# Patient Record
Sex: FEMALE | Race: OTHER | HISPANIC OR LATINO
[De-identification: names, ages, dates, MRNs, and addresses within clinical notes are randomized per-mention and may not be internally consistent; named-entity substitution may affect disease eponyms.]

---

## 2018-01-23 PROBLEM — Z00.00 ENCOUNTER FOR PREVENTIVE HEALTH EXAMINATION: Status: ACTIVE | Noted: 2018-01-23

## 2018-02-06 ENCOUNTER — ASOB RESULT (OUTPATIENT)
Age: 28
End: 2018-02-06

## 2018-02-06 ENCOUNTER — APPOINTMENT (OUTPATIENT)
Dept: ANTEPARTUM | Facility: CLINIC | Age: 28
End: 2018-02-06
Payer: COMMERCIAL

## 2018-02-06 PROCEDURE — 76813 OB US NUCHAL MEAS 1 GEST: CPT

## 2018-02-06 PROCEDURE — 36416 COLLJ CAPILLARY BLOOD SPEC: CPT

## 2018-02-06 PROCEDURE — 76801 OB US < 14 WKS SINGLE FETUS: CPT

## 2018-02-20 ENCOUNTER — ASOB RESULT (OUTPATIENT)
Age: 28
End: 2018-02-20

## 2018-02-20 ENCOUNTER — APPOINTMENT (OUTPATIENT)
Dept: ANTEPARTUM | Facility: CLINIC | Age: 28
End: 2018-02-20
Payer: COMMERCIAL

## 2018-02-20 PROCEDURE — 76817 TRANSVAGINAL US OBSTETRIC: CPT

## 2018-02-20 PROCEDURE — 76815 OB US LIMITED FETUS(S): CPT

## 2018-03-13 ENCOUNTER — APPOINTMENT (OUTPATIENT)
Dept: ANTEPARTUM | Facility: CLINIC | Age: 28
End: 2018-03-13

## 2018-04-03 ENCOUNTER — APPOINTMENT (OUTPATIENT)
Dept: ANTEPARTUM | Facility: CLINIC | Age: 28
End: 2018-04-03
Payer: MEDICAID

## 2018-04-03 ENCOUNTER — ASOB RESULT (OUTPATIENT)
Age: 28
End: 2018-04-03

## 2018-04-03 PROCEDURE — 76811 OB US DETAILED SNGL FETUS: CPT | Mod: 26

## 2018-04-03 PROCEDURE — 76817 TRANSVAGINAL US OBSTETRIC: CPT | Mod: 26

## 2018-08-22 ENCOUNTER — OUTPATIENT (OUTPATIENT)
Dept: OUTPATIENT SERVICES | Facility: HOSPITAL | Age: 28
LOS: 1 days | End: 2018-08-22

## 2018-08-22 ENCOUNTER — EMERGENCY (EMERGENCY)
Facility: HOSPITAL | Age: 28
LOS: 1 days | Discharge: STILL ADMITTED OR ADMINISTRATIVE | End: 2018-08-22
Admitting: EMERGENCY MEDICINE

## 2018-08-22 ENCOUNTER — INPATIENT (INPATIENT)
Facility: HOSPITAL | Age: 28
LOS: 2 days | Discharge: HOME | End: 2018-08-25
Attending: OBSTETRICS & GYNECOLOGY | Admitting: OBSTETRICS & GYNECOLOGY

## 2018-08-22 VITALS
OXYGEN SATURATION: 100 % | TEMPERATURE: 99 F | HEART RATE: 90 BPM | SYSTOLIC BLOOD PRESSURE: 118 MMHG | RESPIRATION RATE: 16 BRPM | DIASTOLIC BLOOD PRESSURE: 75 MMHG

## 2018-08-22 VITALS
DIASTOLIC BLOOD PRESSURE: 69 MMHG | TEMPERATURE: 99 F | OXYGEN SATURATION: 100 % | HEART RATE: 103 BPM | RESPIRATION RATE: 16 BRPM | SYSTOLIC BLOOD PRESSURE: 133 MMHG

## 2018-08-22 DIAGNOSIS — Z3A.00 WEEKS OF GESTATION OF PREGNANCY NOT SPECIFIED: ICD-10-CM

## 2018-08-22 DIAGNOSIS — O26.899 OTHER SPECIFIED PREGNANCY RELATED CONDITIONS, UNSPECIFIED TRIMESTER: ICD-10-CM

## 2018-08-22 DIAGNOSIS — O26.90 PREGNANCY RELATED CONDITIONS, UNSPECIFIED, UNSPECIFIED TRIMESTER: ICD-10-CM

## 2018-08-22 NOTE — ED ADULT TRIAGE NOTE - RESPIRATORY RATE (BREATHS/MIN)
Patient is still experiencing bloating and has pain on her right side.  Appointment scheduled to discuss ultrasound results with Dr. Richter.    16

## 2018-08-22 NOTE — ED ADULT TRIAGE NOTE - CHIEF COMPLAINT QUOTE
pt arrives w/ c/o contractions 5 mins apart. pt states MARCIN 8/21/18 with +fetal movements. pt denies any vaginal discharge or water breaking. Spoke with L&D Daylin butcher to come to L&D.

## 2018-08-23 ENCOUNTER — TRANSCRIPTION ENCOUNTER (OUTPATIENT)
Age: 28
End: 2018-08-23

## 2018-08-23 VITALS — WEIGHT: 205.03 LBS | HEIGHT: 63 IN

## 2018-08-23 LAB
APPEARANCE UR: SIGNIFICANT CHANGE UP
BACTERIA # UR AUTO: NEGATIVE — SIGNIFICANT CHANGE UP
BASOPHILS # BLD AUTO: 0.01 K/UL — SIGNIFICANT CHANGE UP (ref 0–0.2)
BASOPHILS # BLD AUTO: 0.02 K/UL — SIGNIFICANT CHANGE UP (ref 0–0.2)
BASOPHILS NFR BLD AUTO: 0.1 % — SIGNIFICANT CHANGE UP (ref 0–2)
BASOPHILS NFR BLD AUTO: 0.1 % — SIGNIFICANT CHANGE UP (ref 0–2)
BILIRUB UR-MCNC: NEGATIVE — SIGNIFICANT CHANGE UP
BLD GP AB SCN SERPL QL: NEGATIVE — SIGNIFICANT CHANGE UP
BLOOD UR QL VISUAL: HIGH
COLOR SPEC: YELLOW — SIGNIFICANT CHANGE UP
EOSINOPHIL # BLD AUTO: 0 K/UL — SIGNIFICANT CHANGE UP (ref 0–0.5)
EOSINOPHIL # BLD AUTO: 0 K/UL — SIGNIFICANT CHANGE UP (ref 0–0.5)
EOSINOPHIL NFR BLD AUTO: 0 % — SIGNIFICANT CHANGE UP (ref 0–6)
EOSINOPHIL NFR BLD AUTO: 0 % — SIGNIFICANT CHANGE UP (ref 0–6)
GLUCOSE UR-MCNC: NEGATIVE — SIGNIFICANT CHANGE UP
GRAN CASTS # UR COMP ASSIST: SIGNIFICANT CHANGE UP
HCT VFR BLD CALC: 35.3 % — SIGNIFICANT CHANGE UP (ref 34.5–45)
HCT VFR BLD CALC: 40 % — SIGNIFICANT CHANGE UP (ref 34.5–45)
HGB BLD-MCNC: 11.5 G/DL — SIGNIFICANT CHANGE UP (ref 11.5–15.5)
HGB BLD-MCNC: 13 G/DL — SIGNIFICANT CHANGE UP (ref 11.5–15.5)
IMM GRANULOCYTES # BLD AUTO: 0.06 # — SIGNIFICANT CHANGE UP
IMM GRANULOCYTES # BLD AUTO: 0.16 # — SIGNIFICANT CHANGE UP
IMM GRANULOCYTES NFR BLD AUTO: 0.5 % — SIGNIFICANT CHANGE UP (ref 0–1.5)
IMM GRANULOCYTES NFR BLD AUTO: 0.8 % — SIGNIFICANT CHANGE UP (ref 0–1.5)
KETONES UR-MCNC: >150 — HIGH
LEUKOCYTE ESTERASE UR-ACNC: NEGATIVE — SIGNIFICANT CHANGE UP
LYMPHOCYTES # BLD AUTO: 1.38 K/UL — SIGNIFICANT CHANGE UP (ref 1–3.3)
LYMPHOCYTES # BLD AUTO: 1.49 K/UL — SIGNIFICANT CHANGE UP (ref 1–3.3)
LYMPHOCYTES # BLD AUTO: 11 % — LOW (ref 13–44)
LYMPHOCYTES # BLD AUTO: 7.8 % — LOW (ref 13–44)
MCHC RBC-ENTMCNC: 28.2 PG — SIGNIFICANT CHANGE UP (ref 27–34)
MCHC RBC-ENTMCNC: 28.4 PG — SIGNIFICANT CHANGE UP (ref 27–34)
MCHC RBC-ENTMCNC: 32.5 % — SIGNIFICANT CHANGE UP (ref 32–36)
MCHC RBC-ENTMCNC: 32.6 % — SIGNIFICANT CHANGE UP (ref 32–36)
MCV RBC AUTO: 86.5 FL — SIGNIFICANT CHANGE UP (ref 80–100)
MCV RBC AUTO: 87.5 FL — SIGNIFICANT CHANGE UP (ref 80–100)
MONOCYTES # BLD AUTO: 0.56 K/UL — SIGNIFICANT CHANGE UP (ref 0–0.9)
MONOCYTES # BLD AUTO: 1.47 K/UL — HIGH (ref 0–0.9)
MONOCYTES NFR BLD AUTO: 4.5 % — SIGNIFICANT CHANGE UP (ref 2–14)
MONOCYTES NFR BLD AUTO: 7.7 % — SIGNIFICANT CHANGE UP (ref 2–14)
NEUTROPHILS # BLD AUTO: 10.51 K/UL — HIGH (ref 1.8–7.4)
NEUTROPHILS # BLD AUTO: 16.05 K/UL — HIGH (ref 1.8–7.4)
NEUTROPHILS NFR BLD AUTO: 83.6 % — HIGH (ref 43–77)
NEUTROPHILS NFR BLD AUTO: 83.9 % — HIGH (ref 43–77)
NITRITE UR-MCNC: NEGATIVE — SIGNIFICANT CHANGE UP
NRBC # FLD: 0 — SIGNIFICANT CHANGE UP
NRBC # FLD: 0 — SIGNIFICANT CHANGE UP
PH UR: 6 — SIGNIFICANT CHANGE UP (ref 5–8)
PLATELET # BLD AUTO: 199 K/UL — SIGNIFICANT CHANGE UP (ref 150–400)
PLATELET # BLD AUTO: 215 K/UL — SIGNIFICANT CHANGE UP (ref 150–400)
PMV BLD: 10.3 FL — SIGNIFICANT CHANGE UP (ref 7–13)
PMV BLD: 10.4 FL — SIGNIFICANT CHANGE UP (ref 7–13)
PROT UR-MCNC: HIGH
RBC # BLD: 4.08 M/UL — SIGNIFICANT CHANGE UP (ref 3.8–5.2)
RBC # BLD: 4.57 M/UL — SIGNIFICANT CHANGE UP (ref 3.8–5.2)
RBC # FLD: 13.2 % — SIGNIFICANT CHANGE UP (ref 10.3–14.5)
RBC # FLD: 13.2 % — SIGNIFICANT CHANGE UP (ref 10.3–14.5)
RBC CASTS # UR COMP ASSIST: >50 — HIGH (ref 0–?)
RH IG SCN BLD-IMP: POSITIVE — SIGNIFICANT CHANGE UP
RH IG SCN BLD-IMP: POSITIVE — SIGNIFICANT CHANGE UP
SP GR SPEC: 1.03 — SIGNIFICANT CHANGE UP (ref 1–1.04)
SQUAMOUS # UR AUTO: SIGNIFICANT CHANGE UP
T PALLIDUM AB TITR SER: NEGATIVE — SIGNIFICANT CHANGE UP
UROBILINOGEN FLD QL: SIGNIFICANT CHANGE UP
WBC # BLD: 12.52 K/UL — HIGH (ref 3.8–10.5)
WBC # BLD: 19.19 K/UL — HIGH (ref 3.8–10.5)
WBC # FLD AUTO: 12.52 K/UL — HIGH (ref 3.8–10.5)
WBC # FLD AUTO: 19.19 K/UL — HIGH (ref 3.8–10.5)
WBC UR QL: HIGH (ref 0–?)

## 2018-08-23 RX ORDER — FERROUS SULFATE 325(65) MG
325 TABLET ORAL DAILY
Qty: 0 | Refills: 0 | Status: DISCONTINUED | OUTPATIENT
Start: 2018-08-23 | End: 2018-08-24

## 2018-08-23 RX ORDER — GLYCERIN ADULT
1 SUPPOSITORY, RECTAL RECTAL AT BEDTIME
Qty: 0 | Refills: 0 | Status: DISCONTINUED | OUTPATIENT
Start: 2018-08-23 | End: 2018-08-24

## 2018-08-23 RX ORDER — SODIUM CHLORIDE 9 MG/ML
1000 INJECTION, SOLUTION INTRAVENOUS
Qty: 0 | Refills: 0 | Status: DISCONTINUED | OUTPATIENT
Start: 2018-08-23 | End: 2018-08-24

## 2018-08-23 RX ORDER — HYDROCORTISONE 1 %
1 OINTMENT (GRAM) TOPICAL EVERY 4 HOURS
Qty: 0 | Refills: 0 | Status: DISCONTINUED | OUTPATIENT
Start: 2018-08-23 | End: 2018-08-23

## 2018-08-23 RX ORDER — GENTAMICIN SULFATE 40 MG/ML
465 VIAL (ML) INJECTION ONCE
Qty: 0 | Refills: 0 | Status: COMPLETED | OUTPATIENT
Start: 2018-08-23 | End: 2018-08-23

## 2018-08-23 RX ORDER — OXYCODONE HYDROCHLORIDE 5 MG/1
5 TABLET ORAL
Qty: 0 | Refills: 0 | Status: DISCONTINUED | OUTPATIENT
Start: 2018-08-23 | End: 2018-08-25

## 2018-08-23 RX ORDER — SODIUM CHLORIDE 9 MG/ML
3 INJECTION INTRAMUSCULAR; INTRAVENOUS; SUBCUTANEOUS EVERY 8 HOURS
Qty: 0 | Refills: 0 | Status: DISCONTINUED | OUTPATIENT
Start: 2018-08-23 | End: 2018-08-23

## 2018-08-23 RX ORDER — DIBUCAINE 1 %
1 OINTMENT (GRAM) RECTAL EVERY 4 HOURS
Qty: 0 | Refills: 0 | Status: DISCONTINUED | OUTPATIENT
Start: 2018-08-23 | End: 2018-08-23

## 2018-08-23 RX ORDER — AMPICILLIN TRIHYDRATE 250 MG
CAPSULE ORAL
Qty: 0 | Refills: 0 | Status: DISCONTINUED | OUTPATIENT
Start: 2018-08-23 | End: 2018-08-24

## 2018-08-23 RX ORDER — KETOROLAC TROMETHAMINE 30 MG/ML
30 SYRINGE (ML) INJECTION ONCE
Qty: 0 | Refills: 0 | Status: DISCONTINUED | OUTPATIENT
Start: 2018-08-23 | End: 2018-08-23

## 2018-08-23 RX ORDER — AMPICILLIN TRIHYDRATE 250 MG
2 CAPSULE ORAL EVERY 6 HOURS
Qty: 0 | Refills: 0 | Status: DISCONTINUED | OUTPATIENT
Start: 2018-08-23 | End: 2018-08-24

## 2018-08-23 RX ORDER — ACETAMINOPHEN 500 MG
975 TABLET ORAL ONCE
Qty: 0 | Refills: 0 | Status: COMPLETED | OUTPATIENT
Start: 2018-08-23 | End: 2018-08-23

## 2018-08-23 RX ORDER — DOCUSATE SODIUM 100 MG
100 CAPSULE ORAL
Qty: 0 | Refills: 0 | Status: DISCONTINUED | OUTPATIENT
Start: 2018-08-23 | End: 2018-08-24

## 2018-08-23 RX ORDER — OXYTOCIN 10 UNIT/ML
333.33 VIAL (ML) INJECTION
Qty: 20 | Refills: 0 | Status: COMPLETED | OUTPATIENT
Start: 2018-08-23 | End: 2018-08-23

## 2018-08-23 RX ORDER — SODIUM CHLORIDE 9 MG/ML
1000 INJECTION, SOLUTION INTRAVENOUS ONCE
Qty: 0 | Refills: 0 | Status: DISCONTINUED | OUTPATIENT
Start: 2018-08-23 | End: 2018-08-23

## 2018-08-23 RX ORDER — AMPICILLIN TRIHYDRATE 250 MG
2 CAPSULE ORAL ONCE
Qty: 0 | Refills: 0 | Status: COMPLETED | OUTPATIENT
Start: 2018-08-23 | End: 2018-08-23

## 2018-08-23 RX ORDER — ACETAMINOPHEN 500 MG
975 TABLET ORAL EVERY 6 HOURS
Qty: 0 | Refills: 0 | Status: COMPLETED | OUTPATIENT
Start: 2018-08-23 | End: 2019-07-22

## 2018-08-23 RX ORDER — SIMETHICONE 80 MG/1
80 TABLET, CHEWABLE ORAL EVERY 4 HOURS
Qty: 0 | Refills: 0 | Status: DISCONTINUED | OUTPATIENT
Start: 2018-08-23 | End: 2018-08-24

## 2018-08-23 RX ORDER — OXYTOCIN 10 UNIT/ML
41.67 VIAL (ML) INJECTION
Qty: 20 | Refills: 0 | Status: DISCONTINUED | OUTPATIENT
Start: 2018-08-23 | End: 2018-08-23

## 2018-08-23 RX ORDER — OXYCODONE HYDROCHLORIDE 5 MG/1
5 TABLET ORAL EVERY 4 HOURS
Qty: 0 | Refills: 0 | Status: DISCONTINUED | OUTPATIENT
Start: 2018-08-23 | End: 2018-08-25

## 2018-08-23 RX ORDER — OXYTOCIN 10 UNIT/ML
333.33 VIAL (ML) INJECTION
Qty: 20 | Refills: 0 | Status: COMPLETED | OUTPATIENT
Start: 2018-08-23

## 2018-08-23 RX ORDER — GENTAMICIN SULFATE 40 MG/ML
VIAL (ML) INJECTION
Qty: 0 | Refills: 0 | Status: DISCONTINUED | OUTPATIENT
Start: 2018-08-23 | End: 2018-08-23

## 2018-08-23 RX ORDER — SODIUM CHLORIDE 9 MG/ML
1000 INJECTION, SOLUTION INTRAVENOUS
Qty: 0 | Refills: 0 | Status: DISCONTINUED | OUTPATIENT
Start: 2018-08-23 | End: 2018-08-23

## 2018-08-23 RX ORDER — ACETAMINOPHEN 500 MG
975 TABLET ORAL EVERY 6 HOURS
Qty: 0 | Refills: 0 | Status: DISCONTINUED | OUTPATIENT
Start: 2018-08-23 | End: 2018-08-25

## 2018-08-23 RX ORDER — OXYTOCIN 10 UNIT/ML
41.67 VIAL (ML) INJECTION
Qty: 20 | Refills: 0 | Status: DISCONTINUED | OUTPATIENT
Start: 2018-08-23 | End: 2018-08-24

## 2018-08-23 RX ORDER — AER TRAVELER 0.5 G/1
1 SOLUTION RECTAL; TOPICAL EVERY 4 HOURS
Qty: 0 | Refills: 0 | Status: DISCONTINUED | OUTPATIENT
Start: 2018-08-23 | End: 2018-08-23

## 2018-08-23 RX ORDER — CITRIC ACID/SODIUM CITRATE 300-500 MG
15 SOLUTION, ORAL ORAL EVERY 4 HOURS
Qty: 0 | Refills: 0 | Status: DISCONTINUED | OUTPATIENT
Start: 2018-08-23 | End: 2018-08-23

## 2018-08-23 RX ORDER — DIPHENHYDRAMINE HCL 50 MG
25 CAPSULE ORAL EVERY 6 HOURS
Qty: 0 | Refills: 0 | Status: DISCONTINUED | OUTPATIENT
Start: 2018-08-23 | End: 2018-08-24

## 2018-08-23 RX ORDER — IBUPROFEN 200 MG
600 TABLET ORAL EVERY 6 HOURS
Qty: 0 | Refills: 0 | Status: COMPLETED | OUTPATIENT
Start: 2018-08-23 | End: 2019-07-22

## 2018-08-23 RX ORDER — IBUPROFEN 200 MG
600 TABLET ORAL EVERY 6 HOURS
Qty: 0 | Refills: 0 | Status: DISCONTINUED | OUTPATIENT
Start: 2018-08-23 | End: 2018-08-25

## 2018-08-23 RX ORDER — PRAMOXINE HYDROCHLORIDE 150 MG/15G
1 AEROSOL, FOAM RECTAL EVERY 4 HOURS
Qty: 0 | Refills: 0 | Status: DISCONTINUED | OUTPATIENT
Start: 2018-08-23 | End: 2018-08-23

## 2018-08-23 RX ORDER — LANOLIN
1 OINTMENT (GRAM) TOPICAL
Qty: 0 | Refills: 0 | Status: DISCONTINUED | OUTPATIENT
Start: 2018-08-23 | End: 2018-08-24

## 2018-08-23 RX ORDER — TETANUS TOXOID, REDUCED DIPHTHERIA TOXOID AND ACELLULAR PERTUSSIS VACCINE, ADSORBED 5; 2.5; 8; 8; 2.5 [IU]/.5ML; [IU]/.5ML; UG/.5ML; UG/.5ML; UG/.5ML
0.5 SUSPENSION INTRAMUSCULAR ONCE
Qty: 0 | Refills: 0 | Status: DISCONTINUED | OUTPATIENT
Start: 2018-08-23 | End: 2018-08-24

## 2018-08-23 RX ADMIN — Medication 250 MILLIGRAM(S): at 10:50

## 2018-08-23 RX ADMIN — Medication 975 MILLIGRAM(S): at 10:18

## 2018-08-23 RX ADMIN — Medication 100 MILLIGRAM(S): at 14:00

## 2018-08-23 RX ADMIN — Medication 975 MILLIGRAM(S): at 18:15

## 2018-08-23 RX ADMIN — Medication 216 GRAM(S): at 17:13

## 2018-08-23 RX ADMIN — Medication 975 MILLIGRAM(S): at 23:40

## 2018-08-23 RX ADMIN — Medication 30 MILLIGRAM(S): at 14:36

## 2018-08-23 RX ADMIN — Medication 975 MILLIGRAM(S): at 17:12

## 2018-08-23 RX ADMIN — Medication 0.2 MILLIGRAM(S): at 22:35

## 2018-08-23 RX ADMIN — Medication 100 MILLIGRAM(S): at 22:35

## 2018-08-23 RX ADMIN — Medication 30 MILLIGRAM(S): at 14:00

## 2018-08-23 RX ADMIN — Medication 0.2 MILLIGRAM(S): at 11:47

## 2018-08-23 RX ADMIN — Medication 1000 MILLIUNIT(S)/MIN: at 12:00

## 2018-08-23 RX ADMIN — Medication 0.2 MILLIGRAM(S): at 17:13

## 2018-08-23 RX ADMIN — Medication 125 MILLIUNIT(S)/MIN: at 14:06

## 2018-08-23 RX ADMIN — Medication 600 MILLIGRAM(S): at 23:40

## 2018-08-23 RX ADMIN — Medication 216 GRAM(S): at 10:20

## 2018-08-23 NOTE — DISCHARGE NOTE OB - CARE PROVIDER_API CALL
Tiffanie Dumas), [address]  [address] [place] [address]  Phone: [phone]  Fax: [phone]

## 2018-08-23 NOTE — DISCHARGE NOTE OB - MEDICATION SUMMARY - MEDICATIONS TO TAKE
"  Adult Mental Health Outpatient Group Therapy Progress Note       See Initial Treatment suggestions for the client during the time between Diagnostic Assessment and completion of the Master Individualized Treatment Plan.    Treatment Goals:     Will plan and problem-solve to return to previous socia, enjoyable activities and decrease lonliness and isolation  Will plan and problem-solve to decrease fears and anxiety and increase self confidence                       Status: Active  Client will notify staff when needing assistance to develop or implement a coping plan to manage suicidal or self injurious urges.       Area of Treatment Focus:  Symptom Management    Therapeutic Interventions/Treatment Strategies:  Support, Feedback, Structured Activity, Problem Solving, Clarification and Education    Response to Treatment Strategies:  Accepted Feedback, Gave Feedback, Listened, Focused on Goals, Attentive, Accepted Support and Alert    Name of Group:  Coping with Depression     Description and Outcome:  Client participated in a \"Monisha Journal\" exercise which included writing about the positives in one's life and identifying positive steps to take in the upcoming week. Katerina identified people/things in her life that she is thankful for, although she would quickly add \"I don't feel good though\". She made a plan to visit a park this week.  Client demonstrated an understanding of the group content by participating and sharing, although she indicated feeling down today, which made some of the topics challenging.     Is this a Weekly Review of the Progress on the Treatment Plan?  No      " I will START or STAY ON the medications listed below when I get home from the hospital:  None

## 2018-08-23 NOTE — DISCHARGE NOTE OB - PATIENT PORTAL LINK FT
You can access the [facility] Patient Portal, offered by [facility], by registering with the following website: [email or web address]

## 2018-08-23 NOTE — DISCHARGE NOTE OB - CARE PLAN
Principal Discharge DX:	Vacuum extractor delivery, delivered  Goal:	Routine recovery  Assessment and plan of treatment:	Routine postpartum care

## 2018-08-24 DIAGNOSIS — R50.9 FEVER, UNSPECIFIED: ICD-10-CM

## 2018-08-24 LAB
SPECIMEN SOURCE: SIGNIFICANT CHANGE UP

## 2018-08-24 RX ORDER — PRAMOXINE HYDROCHLORIDE 150 MG/15G
1 AEROSOL, FOAM RECTAL EVERY 4 HOURS
Qty: 0 | Refills: 0 | Status: DISCONTINUED | OUTPATIENT
Start: 2018-08-24 | End: 2018-08-25

## 2018-08-24 RX ORDER — DIBUCAINE 1 %
1 OINTMENT (GRAM) RECTAL EVERY 4 HOURS
Qty: 0 | Refills: 0 | Status: DISCONTINUED | OUTPATIENT
Start: 2018-08-24 | End: 2018-08-25

## 2018-08-24 RX ORDER — TETANUS TOXOID, REDUCED DIPHTHERIA TOXOID AND ACELLULAR PERTUSSIS VACCINE, ADSORBED 5; 2.5; 8; 8; 2.5 [IU]/.5ML; [IU]/.5ML; UG/.5ML; UG/.5ML; UG/.5ML
0.5 SUSPENSION INTRAMUSCULAR ONCE
Qty: 0 | Refills: 0 | Status: DISCONTINUED | OUTPATIENT
Start: 2018-08-24 | End: 2018-08-25

## 2018-08-24 RX ORDER — HYDROCORTISONE 1 %
1 OINTMENT (GRAM) TOPICAL EVERY 4 HOURS
Qty: 0 | Refills: 0 | Status: DISCONTINUED | OUTPATIENT
Start: 2018-08-24 | End: 2018-08-25

## 2018-08-24 RX ORDER — PRAMOXINE HYDROCHLORIDE 150 MG/15G
1 AEROSOL, FOAM RECTAL EVERY 4 HOURS
Qty: 0 | Refills: 0 | Status: DISCONTINUED | OUTPATIENT
Start: 2018-08-24 | End: 2018-08-24

## 2018-08-24 RX ORDER — OXYTOCIN 10 UNIT/ML
41.67 VIAL (ML) INJECTION
Qty: 20 | Refills: 0 | Status: DISCONTINUED | OUTPATIENT
Start: 2018-08-24 | End: 2018-08-24

## 2018-08-24 RX ORDER — AER TRAVELER 0.5 G/1
1 SOLUTION RECTAL; TOPICAL EVERY 4 HOURS
Qty: 0 | Refills: 0 | Status: DISCONTINUED | OUTPATIENT
Start: 2018-08-24 | End: 2018-08-25

## 2018-08-24 RX ORDER — HYDROCORTISONE 1 %
1 OINTMENT (GRAM) TOPICAL EVERY 4 HOURS
Qty: 0 | Refills: 0 | Status: DISCONTINUED | OUTPATIENT
Start: 2018-08-24 | End: 2018-08-24

## 2018-08-24 RX ORDER — GLYCERIN ADULT
1 SUPPOSITORY, RECTAL RECTAL AT BEDTIME
Qty: 0 | Refills: 0 | Status: DISCONTINUED | OUTPATIENT
Start: 2018-08-24 | End: 2018-08-25

## 2018-08-24 RX ORDER — DIPHENHYDRAMINE HCL 50 MG
25 CAPSULE ORAL EVERY 6 HOURS
Qty: 0 | Refills: 0 | Status: DISCONTINUED | OUTPATIENT
Start: 2018-08-24 | End: 2018-08-25

## 2018-08-24 RX ORDER — DOCUSATE SODIUM 100 MG
100 CAPSULE ORAL
Qty: 0 | Refills: 0 | Status: DISCONTINUED | OUTPATIENT
Start: 2018-08-24 | End: 2018-08-25

## 2018-08-24 RX ORDER — SIMETHICONE 80 MG/1
80 TABLET, CHEWABLE ORAL EVERY 6 HOURS
Qty: 0 | Refills: 0 | Status: DISCONTINUED | OUTPATIENT
Start: 2018-08-24 | End: 2018-08-25

## 2018-08-24 RX ORDER — SODIUM CHLORIDE 9 MG/ML
3 INJECTION INTRAMUSCULAR; INTRAVENOUS; SUBCUTANEOUS EVERY 8 HOURS
Qty: 0 | Refills: 0 | Status: DISCONTINUED | OUTPATIENT
Start: 2018-08-24 | End: 2018-08-25

## 2018-08-24 RX ORDER — MAGNESIUM HYDROXIDE 400 MG/1
30 TABLET, CHEWABLE ORAL
Qty: 0 | Refills: 0 | Status: DISCONTINUED | OUTPATIENT
Start: 2018-08-24 | End: 2018-08-25

## 2018-08-24 RX ORDER — LANOLIN
1 OINTMENT (GRAM) TOPICAL EVERY 6 HOURS
Qty: 0 | Refills: 0 | Status: DISCONTINUED | OUTPATIENT
Start: 2018-08-24 | End: 2018-08-25

## 2018-08-24 RX ADMIN — Medication 975 MILLIGRAM(S): at 00:10

## 2018-08-24 RX ADMIN — Medication 216 GRAM(S): at 01:00

## 2018-08-24 RX ADMIN — Medication 600 MILLIGRAM(S): at 21:00

## 2018-08-24 RX ADMIN — Medication 600 MILLIGRAM(S): at 00:10

## 2018-08-24 RX ADMIN — Medication 975 MILLIGRAM(S): at 07:52

## 2018-08-24 RX ADMIN — Medication 600 MILLIGRAM(S): at 07:53

## 2018-08-24 RX ADMIN — Medication 600 MILLIGRAM(S): at 07:20

## 2018-08-24 RX ADMIN — Medication 0.2 MILLIGRAM(S): at 04:04

## 2018-08-24 RX ADMIN — OXYCODONE HYDROCHLORIDE 5 MILLIGRAM(S): 5 TABLET ORAL at 19:44

## 2018-08-24 RX ADMIN — Medication 975 MILLIGRAM(S): at 15:54

## 2018-08-24 RX ADMIN — OXYCODONE HYDROCHLORIDE 5 MILLIGRAM(S): 5 TABLET ORAL at 20:15

## 2018-08-24 RX ADMIN — Medication 975 MILLIGRAM(S): at 21:30

## 2018-08-24 RX ADMIN — Medication 975 MILLIGRAM(S): at 16:13

## 2018-08-24 RX ADMIN — Medication 600 MILLIGRAM(S): at 21:30

## 2018-08-24 RX ADMIN — Medication 600 MILLIGRAM(S): at 15:55

## 2018-08-24 RX ADMIN — Medication 600 MILLIGRAM(S): at 16:13

## 2018-08-24 RX ADMIN — Medication 100 MILLIGRAM(S): at 07:30

## 2018-08-24 RX ADMIN — Medication 975 MILLIGRAM(S): at 21:00

## 2018-08-24 RX ADMIN — Medication 975 MILLIGRAM(S): at 07:20

## 2018-08-24 RX ADMIN — Medication 216 GRAM(S): at 08:03

## 2018-08-24 RX ADMIN — Medication 0.2 MILLIGRAM(S): at 08:40

## 2018-08-24 NOTE — PROGRESS NOTE ADULT - PROBLEM SELECTOR PLAN 2
- continue amp/gent/clinda until 24 hours after delivery  - f/u cultures    Ying Jolly, PGY1  Pager #[phone]

## 2018-08-24 NOTE — PROGRESS NOTE ADULT - SUBJECTIVE AND OBJECTIVE BOX
OB Progress Note: VAVD PPD#1    S: 27yo PPD#1 s/p VAVD, c/b IPT. Patient feels well. Pain is well controlled. She is tolerating a regular diet and passing flatus. She is voiding spontaneously, and ambulating without difficulty. Denies CP/SOB. Denies HA/lightheadedness/dizziness. Denies N/V. Denies calf pain.    O:  Vitals:  Vital Signs Last 24 Hrs  T(C): 36.7 (24 Aug 2018 06:00), Max: 37.6 (23 Aug 2018 12:15)  T(F): 98.1 (24 Aug 2018 06:00), Max: 99.7 (23 Aug 2018 12:15)  HR: 73 (24 Aug 2018 06:00) (73 - 120)  BP: 107/65 (24 Aug 2018 06:00) (101/53 - 114/54)  BP(mean): --  RR: 16 (24 Aug 2018 06:00) (16 - 18)  SpO2: 100% (24 Aug 2018 06:00) (100% - 100%)    MEDICATIONS  (STANDING):  acetaminophen   Tablet. 975 milliGRAM(s) Oral every 6 hours  ampicillin  IVPB      ampicillin  IVPB 2 Gram(s) IV Intermittent every 6 hours  clindamycin IVPB 900 milliGRAM(s) IV Intermittent every 8 hours  clindamycin IVPB      diphtheria/tetanus/pertussis (acellular) Vaccine (ADAcel) 0.5 milliLiter(s) IntraMuscular once  ibuprofen  Tablet 600 milliGRAM(s) Oral every 6 hours  methylergonovine 0.2 milliGRAM(s) Oral every 4 hours  oxyCODONE    IR 5 milliGRAM(s) Oral every 3 hours  prenatal multivitamin 1 Tablet(s) Oral daily  sodium chloride 0.9% lock flush 3 milliLiter(s) IV Push every 8 hours      Labs:  Blood type: O Positive  Rubella IgG: RPR: Negative                          11.5   19.19<H> >-----------< 199    ( 08-23 @ 14:30 )             35.3                        13.0   12.52<H> >-----------< 215    ( 08-23 @ 00:10 )             40.0        Physical Exam:  General: NAD  Abdomen: soft, non-tender, non-distended, fundus firm  Vaginal: lochia wnl, exam deferred  Extremities: no erythema/calf tenderness

## 2018-08-24 NOTE — PROGRESS NOTE ADULT - SUBJECTIVE AND OBJECTIVE BOX
ANESTHESIA POSTOP CHECK    28y Female POSTOP DAY 1 S/P labor epidural    Vital Signs Last 24 Hrs  T(C): 36.8 (24 Aug 2018 14:00), Max: 37.2 (23 Aug 2018 16:07)  T(F): 98.2 (24 Aug 2018 14:00), Max: 99 (23 Aug 2018 16:07)  HR: 91 (24 Aug 2018 14:00) (73 - 102)  BP: 96/60 (24 Aug 2018 14:00) (96/60 - 111/59)  BP(mean): --  RR: 18 (24 Aug 2018 14:00) (16 - 18)  SpO2: 100% (24 Aug 2018 14:00) (100% - 100%)  I&O's Summary    23 Aug 2018 07:01  -  24 Aug 2018 07:00  --------------------------------------------------------  IN: 1200 mL / OUT: 2650 mL / NET: -1450 mL        [X ] NO APPARENT ANESTHESIA COMPLICATIONS      Comments:

## 2018-08-25 VITALS
DIASTOLIC BLOOD PRESSURE: 62 MMHG | OXYGEN SATURATION: 100 % | RESPIRATION RATE: 16 BRPM | SYSTOLIC BLOOD PRESSURE: 116 MMHG | TEMPERATURE: 98 F | HEART RATE: 78 BPM

## 2018-08-25 RX ADMIN — Medication 975 MILLIGRAM(S): at 11:00

## 2018-08-25 RX ADMIN — Medication 600 MILLIGRAM(S): at 03:40

## 2018-08-25 RX ADMIN — Medication 600 MILLIGRAM(S): at 10:28

## 2018-08-25 RX ADMIN — Medication 975 MILLIGRAM(S): at 10:26

## 2018-08-25 RX ADMIN — Medication 975 MILLIGRAM(S): at 03:10

## 2018-08-25 RX ADMIN — Medication 600 MILLIGRAM(S): at 03:11

## 2018-08-25 RX ADMIN — Medication 975 MILLIGRAM(S): at 03:40

## 2018-08-25 RX ADMIN — OXYCODONE HYDROCHLORIDE 5 MILLIGRAM(S): 5 TABLET ORAL at 03:11

## 2018-08-25 RX ADMIN — OXYCODONE HYDROCHLORIDE 5 MILLIGRAM(S): 5 TABLET ORAL at 03:40

## 2018-08-25 RX ADMIN — Medication 600 MILLIGRAM(S): at 11:00

## 2018-08-25 NOTE — PROGRESS NOTE ADULT - ASSESSMENT
A/P: 29yo  PPD#2 s/p VAVD c/b intrapartum temperature. Patient is afebrile, stable and doing well post-partum.

## 2018-08-25 NOTE — PROGRESS NOTE ADULT - SUBJECTIVE AND OBJECTIVE BOX
OB Progress Note: VAVD PPD#2    S: 29yo PPD#2 s/p VAVD c/b IPT for which she was treated with amp/gent and clinda. Patient feels well. Pain is well controlled. She is tolerating a regular diet and passing flatus. She is voiding spontaneously, and ambulating without difficulty. Denies CP/SOB. Denies lightheadedness/dizziness. Denies N/V.    O:  Vitals:   Vital Signs Last 24 Hrs  T(C): 36.6 (25 Aug 2018 05:58), Max: 36.8 (24 Aug 2018 14:00)  T(F): 97.9 (25 Aug 2018 05:58), Max: 98.2 (24 Aug 2018 14:00)  HR: 78 (25 Aug 2018 05:58) (78 - 91)  BP: 116/62 (25 Aug 2018 05:58) (96/60 - 116/62)  BP(mean): --  RR: 16 (25 Aug 2018 05:58) (16 - 18)  SpO2: 100% (25 Aug 2018 05:58) (100% - 100%)    MEDICATIONS  (STANDING):  acetaminophen   Tablet. 975 milliGRAM(s) Oral every 6 hours  diphtheria/tetanus/pertussis (acellular) Vaccine (ADAcel) 0.5 milliLiter(s) IntraMuscular once  ibuprofen  Tablet 600 milliGRAM(s) Oral every 6 hours  oxyCODONE    IR 5 milliGRAM(s) Oral every 3 hours  prenatal multivitamin 1 Tablet(s) Oral daily  sodium chloride 0.9% lock flush 3 milliLiter(s) IV Push every 8 hours    MEDICATIONS  (PRN):  dibucaine 1% Ointment 1 Application(s) Topical every 4 hours PRN Perineal Discomfort  diphenhydrAMINE   Capsule 25 milliGRAM(s) Oral every 6 hours PRN Itching  docusate sodium 100 milliGRAM(s) Oral two times a day PRN Stool Softening  glycerin Suppository - Adult 1 Suppository(s) Rectal at bedtime PRN Constipation  hydrocortisone 1% Cream 1 Application(s) Topical every 4 hours PRN perineal discomfort  lanolin Ointment 1 Application(s) Topical every 6 hours PRN Sore Nipples  magnesium hydroxide Suspension 30 milliLiter(s) Oral two times a day PRN Constipation  oxyCODONE    IR 5 milliGRAM(s) Oral every 4 hours PRN Severe Pain (7 -10)  pramoxine 1%/zinc 5% Cream 1 Application(s) Topical every 4 hours PRN perineal discomfort  simethicone 80 milliGRAM(s) Chew every 6 hours PRN Gas  witch hazel Pads 1 Application(s) Topical every 4 hours PRN Perineal Discomfort      Labs:  Blood type: O Positive  Rubella IgG: RPR: Negative                          11.5   19.19<H> >-----------< 199    ( 08-23 @ 14:30 )             35.3                        13.0   12.52<H> >-----------< 215    ( 08-23 @ 00:10 )             40.0        Physical Exam:  General: NAD  Abdomen: soft, non-tender, non-distended, fundus firm  Vaginal: Lochia wnl  Extremities: No erythema/edema

## 2018-08-25 NOTE — PROGRESS NOTE ADULT - PROBLEM SELECTOR PLAN 1
1. DERM- staple removal  2. RECHECK- after 6/1/18 for well child   - Pain well controlled, continue current pain regimen  - Increase ambulation, SCDs when not ambulating  - Continue regular diet  - Discharge planning   Nakia Davis PGY1

## 2018-08-28 LAB
BACTERIA BLD CULT: SIGNIFICANT CHANGE UP
BACTERIA BLD CULT: SIGNIFICANT CHANGE UP

## 2021-12-31 ENCOUNTER — TRANSCRIPTION ENCOUNTER (OUTPATIENT)
Age: 31
End: 2021-12-31

## 2022-04-01 ENCOUNTER — APPOINTMENT (OUTPATIENT)
Dept: ANTEPARTUM | Facility: CLINIC | Age: 32
End: 2022-04-01

## 2022-04-06 ENCOUNTER — APPOINTMENT (OUTPATIENT)
Dept: ANTEPARTUM | Facility: CLINIC | Age: 32
End: 2022-04-06
Payer: MEDICAID

## 2022-04-06 ENCOUNTER — ASOB RESULT (OUTPATIENT)
Age: 32
End: 2022-04-06

## 2022-04-06 PROCEDURE — 76813 OB US NUCHAL MEAS 1 GEST: CPT | Mod: 59

## 2022-04-06 PROCEDURE — 76801 OB US < 14 WKS SINGLE FETUS: CPT

## 2022-04-25 ENCOUNTER — APPOINTMENT (OUTPATIENT)
Dept: OBGYN | Facility: CLINIC | Age: 32
End: 2022-04-25
Payer: MEDICAID

## 2022-04-25 VITALS
SYSTOLIC BLOOD PRESSURE: 97 MMHG | DIASTOLIC BLOOD PRESSURE: 56 MMHG | HEIGHT: 62 IN | BODY MASS INDEX: 36.8 KG/M2 | WEIGHT: 200 LBS

## 2022-04-25 DIAGNOSIS — Z87.59 PERSONAL HISTORY OF OTHER COMPLICATIONS OF PREGNANCY, CHILDBIRTH AND THE PUERPERIUM: ICD-10-CM

## 2022-04-25 DIAGNOSIS — Z98.890 OTHER SPECIFIED POSTPROCEDURAL STATES: ICD-10-CM

## 2022-04-25 DIAGNOSIS — Z78.9 OTHER SPECIFIED HEALTH STATUS: ICD-10-CM

## 2022-04-25 PROCEDURE — 0502F SUBSEQUENT PRENATAL CARE: CPT

## 2022-04-25 RX ORDER — ASCORBIC ACID, CHOLECALCIFEROL, .ALPHA.-TOCOPHEROL ACETATE, DL-, PYRIDOXINE, FOLIC ACID, CYANOCOBALAMIN, CALCIUM, FERROUS FUMARATE, MAGNESIUM, DOCONEXENT 85; 200; 10; 25; 1; 12; 140; 27; 45; 300 [IU]/1; [IU]/1; [IU]/1; [IU]/1; MG/1; UG/1; MG/1; MG/1; MG/1; MG/1
27-0.6-0.4-3 CAPSULE, GELATIN COATED ORAL
Qty: 30 | Refills: 8 | Status: ACTIVE | COMMUNITY
Start: 2022-04-25 | End: 1900-01-01

## 2022-04-28 LAB
2ND TRIMESTER DATA: NORMAL
AFP PNL SERPL: NORMAL
AFP SERPL-ACNC: NORMAL
CLINICAL BIOCHEMIST REVIEW: NORMAL
NOTES NTD: NORMAL

## 2022-05-23 ENCOUNTER — APPOINTMENT (OUTPATIENT)
Dept: OBGYN | Facility: CLINIC | Age: 32
End: 2022-05-23
Payer: MEDICAID

## 2022-05-23 ENCOUNTER — ASOB RESULT (OUTPATIENT)
Age: 32
End: 2022-05-23

## 2022-05-23 ENCOUNTER — APPOINTMENT (OUTPATIENT)
Dept: ANTEPARTUM | Facility: CLINIC | Age: 32
End: 2022-05-23

## 2022-05-23 VITALS — SYSTOLIC BLOOD PRESSURE: 96 MMHG | BODY MASS INDEX: 37.31 KG/M2 | DIASTOLIC BLOOD PRESSURE: 63 MMHG | WEIGHT: 204 LBS

## 2022-05-23 PROCEDURE — 0502F SUBSEQUENT PRENATAL CARE: CPT

## 2022-05-23 PROCEDURE — 76805 OB US >/= 14 WKS SNGL FETUS: CPT

## 2022-06-20 ENCOUNTER — APPOINTMENT (OUTPATIENT)
Dept: OBGYN | Facility: CLINIC | Age: 32
End: 2022-06-20

## 2022-06-20 PROCEDURE — 0502F SUBSEQUENT PRENATAL CARE: CPT

## 2022-06-20 PROCEDURE — 36415 COLL VENOUS BLD VENIPUNCTURE: CPT

## 2022-06-22 LAB
BASOPHILS # BLD AUTO: 0.03 K/UL
BASOPHILS NFR BLD AUTO: 0.3 %
EOSINOPHIL # BLD AUTO: 0.07 K/UL
EOSINOPHIL NFR BLD AUTO: 0.8 %
GLUCOSE 1H P 50 G GLC PO SERPL-MCNC: 75 MG/DL
HCT VFR BLD CALC: 38 %
HGB BLD-MCNC: 11.7 G/DL
IMM GRANULOCYTES NFR BLD AUTO: 0.4 %
LYMPHOCYTES # BLD AUTO: 1.96 K/UL
LYMPHOCYTES NFR BLD AUTO: 21.7 %
MAN DIFF?: NORMAL
MCHC RBC-ENTMCNC: 28.4 PG
MCHC RBC-ENTMCNC: 30.8 GM/DL
MCV RBC AUTO: 92.2 FL
MONOCYTES # BLD AUTO: 0.49 K/UL
MONOCYTES NFR BLD AUTO: 5.4 %
NEUTROPHILS # BLD AUTO: 6.44 K/UL
NEUTROPHILS NFR BLD AUTO: 71.4 %
PLATELET # BLD AUTO: 229 K/UL
RBC # BLD: 4.12 M/UL
RBC # FLD: 13.6 %
WBC # FLD AUTO: 9.03 K/UL

## 2022-07-18 ENCOUNTER — APPOINTMENT (OUTPATIENT)
Dept: OBGYN | Facility: CLINIC | Age: 32
End: 2022-07-18

## 2022-07-18 VITALS — SYSTOLIC BLOOD PRESSURE: 107 MMHG | WEIGHT: 210 LBS | DIASTOLIC BLOOD PRESSURE: 70 MMHG | BODY MASS INDEX: 38.41 KG/M2

## 2022-07-18 PROCEDURE — ZZZZZ: CPT

## 2022-08-01 ENCOUNTER — APPOINTMENT (OUTPATIENT)
Dept: OBGYN | Facility: CLINIC | Age: 32
End: 2022-08-01

## 2022-08-01 ENCOUNTER — APPOINTMENT (OUTPATIENT)
Dept: ANTEPARTUM | Facility: CLINIC | Age: 32
End: 2022-08-01

## 2022-08-01 PROCEDURE — 0502F SUBSEQUENT PRENATAL CARE: CPT

## 2022-08-01 PROCEDURE — 76819 FETAL BIOPHYS PROFIL W/O NST: CPT

## 2022-08-01 PROCEDURE — 76816 OB US FOLLOW-UP PER FETUS: CPT

## 2022-08-15 ENCOUNTER — APPOINTMENT (OUTPATIENT)
Dept: OBGYN | Facility: CLINIC | Age: 32
End: 2022-08-15

## 2022-08-26 ENCOUNTER — APPOINTMENT (OUTPATIENT)
Dept: OBGYN | Facility: CLINIC | Age: 32
End: 2022-08-26

## 2022-08-26 VITALS — DIASTOLIC BLOOD PRESSURE: 76 MMHG | WEIGHT: 214 LBS | BODY MASS INDEX: 39.14 KG/M2 | SYSTOLIC BLOOD PRESSURE: 115 MMHG

## 2022-08-26 PROCEDURE — 0502F SUBSEQUENT PRENATAL CARE: CPT

## 2022-09-09 ENCOUNTER — APPOINTMENT (OUTPATIENT)
Dept: OBGYN | Facility: CLINIC | Age: 32
End: 2022-09-09

## 2022-09-16 ENCOUNTER — APPOINTMENT (OUTPATIENT)
Dept: OBGYN | Facility: CLINIC | Age: 32
End: 2022-09-16

## 2022-09-16 VITALS
SYSTOLIC BLOOD PRESSURE: 103 MMHG | DIASTOLIC BLOOD PRESSURE: 69 MMHG | WEIGHT: 221 LBS | BODY MASS INDEX: 40.67 KG/M2 | HEIGHT: 62 IN

## 2022-09-16 PROCEDURE — 0502F SUBSEQUENT PRENATAL CARE: CPT

## 2022-09-16 PROCEDURE — 36415 COLL VENOUS BLD VENIPUNCTURE: CPT

## 2022-09-19 LAB
B-HEM STREP SPEC QL CULT: NORMAL
HIV1+2 AB SPEC QL IA.RAPID: NONREACTIVE

## 2022-09-29 ENCOUNTER — APPOINTMENT (OUTPATIENT)
Dept: ANTEPARTUM | Facility: CLINIC | Age: 32
End: 2022-09-29

## 2022-09-29 ENCOUNTER — APPOINTMENT (OUTPATIENT)
Dept: OBGYN | Facility: CLINIC | Age: 32
End: 2022-09-29

## 2022-09-29 VITALS — WEIGHT: 223 LBS | DIASTOLIC BLOOD PRESSURE: 64 MMHG | BODY MASS INDEX: 40.79 KG/M2 | SYSTOLIC BLOOD PRESSURE: 103 MMHG

## 2022-09-29 PROCEDURE — 76819 FETAL BIOPHYS PROFIL W/O NST: CPT | Mod: 59

## 2022-09-29 PROCEDURE — 76816 OB US FOLLOW-UP PER FETUS: CPT

## 2022-09-29 PROCEDURE — 0502F SUBSEQUENT PRENATAL CARE: CPT

## 2022-10-04 ENCOUNTER — TRANSCRIPTION ENCOUNTER (OUTPATIENT)
Age: 32
End: 2022-10-04

## 2022-10-04 ENCOUNTER — NON-APPOINTMENT (OUTPATIENT)
Age: 32
End: 2022-10-04

## 2022-10-04 ENCOUNTER — INPATIENT (INPATIENT)
Facility: HOSPITAL | Age: 32
LOS: 1 days | Discharge: HOME | End: 2022-10-06
Attending: OBSTETRICS & GYNECOLOGY | Admitting: OBSTETRICS & GYNECOLOGY

## 2022-10-04 VITALS
DIASTOLIC BLOOD PRESSURE: 68 MMHG | RESPIRATION RATE: 16 BRPM | TEMPERATURE: 98 F | HEART RATE: 75 BPM | SYSTOLIC BLOOD PRESSURE: 122 MMHG

## 2022-10-04 DIAGNOSIS — O26.899 OTHER SPECIFIED PREGNANCY RELATED CONDITIONS, UNSPECIFIED TRIMESTER: ICD-10-CM

## 2022-10-04 DIAGNOSIS — Z3A.00 WEEKS OF GESTATION OF PREGNANCY NOT SPECIFIED: ICD-10-CM

## 2022-10-04 LAB
BASOPHILS # BLD AUTO: 0.02 K/UL — SIGNIFICANT CHANGE UP (ref 0–0.2)
BASOPHILS NFR BLD AUTO: 0.2 % — SIGNIFICANT CHANGE UP (ref 0–2)
BLD GP AB SCN SERPL QL: NEGATIVE — SIGNIFICANT CHANGE UP
EOSINOPHIL # BLD AUTO: 0.03 K/UL — SIGNIFICANT CHANGE UP (ref 0–0.5)
EOSINOPHIL NFR BLD AUTO: 0.3 % — SIGNIFICANT CHANGE UP (ref 0–6)
HCT VFR BLD CALC: 40.5 % — SIGNIFICANT CHANGE UP (ref 34.5–45)
HGB BLD-MCNC: 12.8 G/DL — SIGNIFICANT CHANGE UP (ref 11.5–15.5)
IANC: 7.73 K/UL — HIGH (ref 1.8–7.4)
IMM GRANULOCYTES NFR BLD AUTO: 0.5 % — SIGNIFICANT CHANGE UP (ref 0–0.9)
LYMPHOCYTES # BLD AUTO: 2.21 K/UL — SIGNIFICANT CHANGE UP (ref 1–3.3)
LYMPHOCYTES # BLD AUTO: 20.9 % — SIGNIFICANT CHANGE UP (ref 13–44)
MCHC RBC-ENTMCNC: 27.2 PG — SIGNIFICANT CHANGE UP (ref 27–34)
MCHC RBC-ENTMCNC: 31.6 GM/DL — LOW (ref 32–36)
MCV RBC AUTO: 86 FL — SIGNIFICANT CHANGE UP (ref 80–100)
MONOCYTES # BLD AUTO: 0.54 K/UL — SIGNIFICANT CHANGE UP (ref 0–0.9)
MONOCYTES NFR BLD AUTO: 5.1 % — SIGNIFICANT CHANGE UP (ref 2–14)
NEUTROPHILS # BLD AUTO: 7.73 K/UL — HIGH (ref 1.8–7.4)
NEUTROPHILS NFR BLD AUTO: 73 % — SIGNIFICANT CHANGE UP (ref 43–77)
NRBC # BLD: 0 /100 WBCS — SIGNIFICANT CHANGE UP (ref 0–0)
NRBC # FLD: 0 K/UL — SIGNIFICANT CHANGE UP (ref 0–0)
PLATELET # BLD AUTO: 264 K/UL — SIGNIFICANT CHANGE UP (ref 150–400)
RBC # BLD: 4.71 M/UL — SIGNIFICANT CHANGE UP (ref 3.8–5.2)
RBC # FLD: 12.7 % — SIGNIFICANT CHANGE UP (ref 10.3–14.5)
RH IG SCN BLD-IMP: POSITIVE — SIGNIFICANT CHANGE UP
WBC # BLD: 10.58 K/UL — HIGH (ref 3.8–10.5)
WBC # FLD AUTO: 10.58 K/UL — HIGH (ref 3.8–10.5)

## 2022-10-04 PROCEDURE — 59400 OBSTETRICAL CARE: CPT | Mod: U9

## 2022-10-04 RX ORDER — INFLUENZA VIRUS VACCINE 15; 15; 15; 15 UG/.5ML; UG/.5ML; UG/.5ML; UG/.5ML
0.5 SUSPENSION INTRAMUSCULAR ONCE
Refills: 0 | Status: DISCONTINUED | OUTPATIENT
Start: 2022-10-04 | End: 2022-10-06

## 2022-10-04 RX ORDER — IBUPROFEN 200 MG
1 TABLET ORAL
Qty: 0 | Refills: 0 | DISCHARGE
Start: 2022-10-04

## 2022-10-04 RX ORDER — OXYTOCIN 10 UNIT/ML
333.33 VIAL (ML) INJECTION
Qty: 20 | Refills: 0 | Status: DISCONTINUED | OUTPATIENT
Start: 2022-10-04 | End: 2022-10-05

## 2022-10-04 RX ORDER — SODIUM CHLORIDE 9 MG/ML
1000 INJECTION, SOLUTION INTRAVENOUS ONCE
Refills: 0 | Status: DISCONTINUED | OUTPATIENT
Start: 2022-10-04 | End: 2022-10-04

## 2022-10-04 RX ORDER — HYDROCORTISONE 1 %
1 OINTMENT (GRAM) TOPICAL EVERY 6 HOURS
Refills: 0 | Status: DISCONTINUED | OUTPATIENT
Start: 2022-10-04 | End: 2022-10-06

## 2022-10-04 RX ORDER — OXYCODONE HYDROCHLORIDE 5 MG/1
5 TABLET ORAL
Refills: 0 | Status: DISCONTINUED | OUTPATIENT
Start: 2022-10-04 | End: 2022-10-06

## 2022-10-04 RX ORDER — OXYCODONE HYDROCHLORIDE 5 MG/1
5 TABLET ORAL ONCE
Refills: 0 | Status: DISCONTINUED | OUTPATIENT
Start: 2022-10-04 | End: 2022-10-06

## 2022-10-04 RX ORDER — SIMETHICONE 80 MG/1
80 TABLET, CHEWABLE ORAL EVERY 4 HOURS
Refills: 0 | Status: DISCONTINUED | OUTPATIENT
Start: 2022-10-04 | End: 2022-10-06

## 2022-10-04 RX ORDER — LANOLIN
1 OINTMENT (GRAM) TOPICAL EVERY 6 HOURS
Refills: 0 | Status: DISCONTINUED | OUTPATIENT
Start: 2022-10-04 | End: 2022-10-06

## 2022-10-04 RX ORDER — MAGNESIUM HYDROXIDE 400 MG/1
30 TABLET, CHEWABLE ORAL
Refills: 0 | Status: DISCONTINUED | OUTPATIENT
Start: 2022-10-04 | End: 2022-10-06

## 2022-10-04 RX ORDER — DIBUCAINE 1 %
1 OINTMENT (GRAM) RECTAL EVERY 6 HOURS
Refills: 0 | Status: DISCONTINUED | OUTPATIENT
Start: 2022-10-04 | End: 2022-10-06

## 2022-10-04 RX ORDER — PRAMOXINE HYDROCHLORIDE 150 MG/15G
1 AEROSOL, FOAM RECTAL EVERY 4 HOURS
Refills: 0 | Status: DISCONTINUED | OUTPATIENT
Start: 2022-10-04 | End: 2022-10-06

## 2022-10-04 RX ORDER — CITRIC ACID/SODIUM CITRATE 300-500 MG
15 SOLUTION, ORAL ORAL EVERY 6 HOURS
Refills: 0 | Status: DISCONTINUED | OUTPATIENT
Start: 2022-10-04 | End: 2022-10-04

## 2022-10-04 RX ORDER — ACETAMINOPHEN 500 MG
975 TABLET ORAL
Refills: 0 | Status: DISCONTINUED | OUTPATIENT
Start: 2022-10-04 | End: 2022-10-06

## 2022-10-04 RX ORDER — KETOROLAC TROMETHAMINE 30 MG/ML
30 SYRINGE (ML) INJECTION ONCE
Refills: 0 | Status: DISCONTINUED | OUTPATIENT
Start: 2022-10-04 | End: 2022-10-05

## 2022-10-04 RX ORDER — ACETAMINOPHEN 500 MG
3 TABLET ORAL
Qty: 0 | Refills: 0 | DISCHARGE
Start: 2022-10-04

## 2022-10-04 RX ORDER — TETANUS TOXOID, REDUCED DIPHTHERIA TOXOID AND ACELLULAR PERTUSSIS VACCINE, ADSORBED 5; 2.5; 8; 8; 2.5 [IU]/.5ML; [IU]/.5ML; UG/.5ML; UG/.5ML; UG/.5ML
0.5 SUSPENSION INTRAMUSCULAR ONCE
Refills: 0 | Status: DISCONTINUED | OUTPATIENT
Start: 2022-10-04 | End: 2022-10-06

## 2022-10-04 RX ORDER — SODIUM CHLORIDE 9 MG/ML
3 INJECTION INTRAMUSCULAR; INTRAVENOUS; SUBCUTANEOUS EVERY 8 HOURS
Refills: 0 | Status: DISCONTINUED | OUTPATIENT
Start: 2022-10-04 | End: 2022-10-06

## 2022-10-04 RX ORDER — DIPHENHYDRAMINE HCL 50 MG
25 CAPSULE ORAL EVERY 6 HOURS
Refills: 0 | Status: DISCONTINUED | OUTPATIENT
Start: 2022-10-04 | End: 2022-10-06

## 2022-10-04 RX ORDER — SODIUM CHLORIDE 9 MG/ML
1000 INJECTION, SOLUTION INTRAVENOUS
Refills: 0 | Status: DISCONTINUED | OUTPATIENT
Start: 2022-10-04 | End: 2022-10-04

## 2022-10-04 RX ORDER — BENZOCAINE 10 %
1 GEL (GRAM) MUCOUS MEMBRANE EVERY 6 HOURS
Refills: 0 | Status: DISCONTINUED | OUTPATIENT
Start: 2022-10-04 | End: 2022-10-06

## 2022-10-04 RX ORDER — OXYTOCIN 10 UNIT/ML
333.33 VIAL (ML) INJECTION
Qty: 20 | Refills: 0 | Status: DISCONTINUED | OUTPATIENT
Start: 2022-10-04 | End: 2022-10-04

## 2022-10-04 RX ORDER — AER TRAVELER 0.5 G/1
1 SOLUTION RECTAL; TOPICAL EVERY 4 HOURS
Refills: 0 | Status: DISCONTINUED | OUTPATIENT
Start: 2022-10-04 | End: 2022-10-06

## 2022-10-04 RX ORDER — IBUPROFEN 200 MG
600 TABLET ORAL EVERY 6 HOURS
Refills: 0 | Status: COMPLETED | OUTPATIENT
Start: 2022-10-04 | End: 2023-09-02

## 2022-10-04 NOTE — OB RN PATIENT PROFILE - FALL HARM RISK - UNIVERSAL INTERVENTIONS
Bed in lowest position, wheels locked, appropriate side rails in place/Call bell, personal items and telephone in reach/Instruct patient to call for assistance before getting out of bed or chair/Non-slip footwear when patient is out of bed/[place] to call system/Physically safe environment - no spills, clutter or unnecessary equipment/Purposeful Proactive Rounding/Room/bathroom lighting operational, light cord in reach

## 2022-10-04 NOTE — OB RN PATIENT PROFILE - NSPRENATALGBS_OBGYN_ALL_OB_GET_DAYS
Yes, you will likely benefit from testosterone therapy.  Will start Testosterone 100 mg IM every 14 days and recheck level in 8 weeks after starting treatment (midway between injections)     Because Your prostate enzyme was 2.6 I prefer you see your PCP for prostate exam before we start therapy and will also recheck level after starting T therapy 18

## 2022-10-04 NOTE — OB PROVIDER DELIVERY SUMMARY - NSSELHIDDEN_OBGYN_ALL_OB_FT
[NS_DeliveryAttending1_OBGYN_ALL_OB_FT:[id number]=],[NS_DeliveryAssist1_OBGYN_ALL_OB_FT:[id number]=]

## 2022-10-04 NOTE — OB PROVIDER H&P - PROBLEM SELECTOR PLAN 1
plan d/w Dr. Ramos   patient admitted for labor  for epidural for pain management  routine orders  gbs negative  covid pcr sent  consents signed by patient

## 2022-10-04 NOTE — OB PROVIDER H&P - HISTORY OF PRESENT ILLNESS
32 year old  at 39.5 presents with c/o painful contractions every 3 min reports pain 10/10 requesting epidural. Patient denies leaking of fluid, vaginal bleeding, reports good fetal movement. Denies ob complications.   PMH: Denies  PSH: Denies  Allergies: NKDA  Meds: PNV   Denies hx of anxiety, depression, pp depression   Denies use of etoh, drugs, tobacco during pregnancy

## 2022-10-04 NOTE — DISCHARGE NOTE OB - NS MD DC FALL RISK RISK
For information on Fall & Injury Prevention, visit: [email or web address] [place]  [email or web address] [place]  https://www.cdc.gov/steadi/patient.html

## 2022-10-04 NOTE — OB PROVIDER DELIVERY SUMMARY - NSPROVIDERDELIVERYNOTE_OBGYN_ALL_OB_FT
Spontaneous vaginal delivery of liveborn infant from RENETTA position. Head, shoulders, and body delivered easily. Infant was suctioned. Meconium noted. Cord was clamped and cut and infant was passed to mother/peds. Placenta delivered intact with a 3 vessel cord and appearance of velamentous cord. Fundal massage and bimanual massage was given and uterine fundus was found to be firm. Vaginal exam revealed an intact cervix, vaginal walls, sulci and perineum. Small superficial abrasion made hemostatic with a figure of 8 suture using 2.0 chromic. Excellent hemostasis was noted. patient was stable and went to recovery. Count was correct x 2.

## 2022-10-04 NOTE — DISCHARGE NOTE OB - PATIENT PORTAL LINK FT
You can access the FollowMyHealth Patient Portal offered by [facility] by registering at the following website: [email or web address]. By joining [employer]’s FollowMyHealth portal, you will also be able to view your health information using other applications (apps) compatible with our system.

## 2022-10-04 NOTE — OB RN DELIVERY SUMMARY - NS_SEPSISRSKCALC_OBGYN_ALL_OB_FT
EOS calculated successfully. EOS Risk Factor: 0.5/1000 live births ([facility] national incidence); GA=39w5d; Temp=98.42; ROM=0.5; GBS='Negative'; Antibiotics='No antibiotics or any antibiotics < 2 hrs prior to birth'

## 2022-10-04 NOTE — OB RN DELIVERY SUMMARY - NS_SEROLOGYDONE_OBGYN_ALL_OB
Labor Epidural      Patient reassessed immediately prior to procedure    Patient location during procedure: OB  Performed By  Anesthesiologist: Roshan Patrick DO  Preanesthetic Checklist  Completed: patient identified, site marked, surgical consent, pre-op evaluation, timeout performed, IV checked, risks and benefits discussed and monitors and equipment checked  Prep:  Pt Position:sitting  Sterile Tech:gloves, mask, sterile barrier and cap  Prep:chlorhexidine gluconate and isopropyl alcohol  Monitoring:blood pressure monitoring and continuous pulse oximetry  Epidural Block Procedure:  Approach:midline  Guidance:landmark technique and palpation technique  Location:L3-L4  Needle Type:Tuohy  Needle Gauge:17 G  Loss of Resistance Medium: air  Loss of Resistance: 5cm  Cath Depth at skin:10 cm  Paresthesia: none  Aspiration:negative  Test Dose:negative  Number of Attempts: 1  Post Assessment:  Dressing:secured with tape and occlusive dressing applied (Tegaderm Placed)  Pt Tolerance:patient tolerated the procedure well with no apparent complications  Complications:no             Yes

## 2022-10-04 NOTE — DISCHARGE NOTE OB - CARE PLAN
Assessment and plan of treatment:	- continue routine pain mgt.  - continue perineal care   - pelvic rest for 6 wk  - Patient to follow up in 6 wk  - Please notify MD if you experience persistent and overwhelming emotions inhibiting daily activities or infant care, persistent headache, excessive vaginal bleeding, foul smelling vaginal discharge, Urinary urgency/frequency/burning, or localized lower extremity swelling/warmth/redness.   Principal Discharge DX:	Vaginal delivery  Assessment and plan of treatment:	- continue routine pain mgt.  - continue perineal care   - pelvic rest for 6 wk  - Patient to follow up in 6 wk  - Please notify MD if you experience persistent and overwhelming emotions inhibiting daily activities or infant care, persistent headache, excessive vaginal bleeding, foul smelling vaginal discharge, Urinary urgency/frequency/burning, or localized lower extremity swelling/warmth/redness.   1

## 2022-10-04 NOTE — OB PROVIDER H&P - NS_OBGYNHISTORY_OBGYN_ALL_OB_FT
AP course uncomplicated  OB: 10/28/2014 VAVD 7#7   GYN: chlamydia many years ago    GBS negative 9/16

## 2022-10-04 NOTE — OB RN PATIENT PROFILE - SUPPORT PERSON NAME
Luannase Kenyon lópez

## 2022-10-04 NOTE — DISCHARGE NOTE OB - MEDICATION SUMMARY - MEDICATIONS TO TAKE
I will START or STAY ON the medications listed below when I get home from the hospital:    acetaminophen 325 mg oral tablet  -- 3 tab(s) by mouth every 6 hours  -- Indication: For vaginal delivery    ibuprofen 600 mg oral tablet  -- 1 tab(s) by mouth every 6 hours  -- Indication: For vaginal delivery    Prenatal Multivitamins with Folic Acid 1 mg oral tablet  -- 1 tab(s) by mouth once a day  -- Indication: For vaginal delivery

## 2022-10-04 NOTE — OB PROVIDER DELIVERY SUMMARY - NSLACERATION_OBGYN_ALL_OB
Pt scheduled for scope. Instructions mailed to home and sent via [employer]. Pt verbalized understanding of preop COVID test requirement.     No

## 2022-10-04 NOTE — OB PROVIDER H&P - NSHPPHYSICALEXAM_GEN_ALL_CORE
Vital Signs Last 24 Hrs  T(C): 36.9 (04 Oct 2022 20:14), Max: 36.9 (04 Oct 2022 20:14)  T(F): 98.4 (04 Oct 2022 20:14), Max: 98.4 (04 Oct 2022 20:14)  HR: 70 (04 Oct 2022 21:03) (70 - 75)  BP: 118/77 (04 Oct 2022 21:03) (118/77 - 122/68)  BP(mean): --  RR: 16 (04 Oct 2022 20:14) (16 - 16)  SpO2: --    Assessment reveals VSS  Abdomen soft, NT, gravid  Cat 1 tracing  moderate variability with accels, no decels noted, ctx every 3 mins  Transabdominal Ultrasound- cephalic   Vaginal Exam- 8/100/-2  A&Ox3  Lungs- clear bilateral  Heart- normal rate and rhythm

## 2022-10-04 NOTE — OB NEONATOLOGY/PEDIATRICIAN DELIVERY SUMMARY - NSPEDSNEONOTESA_OBGYN_ALL_OB_FT
Called by OBGYN to attend [facility] delivery due to meconium. Baby is product of a 39.5 week gestation born to a  32 y.o. F. Maternal labs include Blood Type O+, HIV-, RPR pending, Hep B pending, GBS-. Maternal history is nonsignificant. Pregnancy was uncomplicated. AROM on 10/4 at 22:08 with mec fluid. Delivery complicated by meconium. Baby emerged crying and vigorous. Infant was brought to radiant warmer and warmed, dried, stimulated and suctioned. HR>100, normal respiratory effort with APGARS of 9/9. Delayed cord clamping x60s performed. Resuscitation included bulb suction. Highest maternal temperature 36.7 C. EOS score: 0.04. Admit to NBN.    Mom plans to initiate breastfeeding, consents Hep B vaccine.

## 2022-10-04 NOTE — DISCHARGE NOTE OB - HISTORY OF COVID-19 VACCINATION
Detail Level: Detailed
Additional Notes: Have you travelled internationally or travelled on a cruise ship in the last 14 days? No\\nHave you travelled to [place], [place], [place], [place] or [place] in the last 14 days? No\\nHave you been in contact with anyone who is suspected of having, being tested, or has tested positive for the coronavirus? No\\nHave you been experiencing fever or respiratory symptoms? No\\nHave you experienced GI symptoms - diarrhea, vomiting, nausea, or stomach pains in the last week? No
Yes

## 2022-10-04 NOTE — OB PROVIDER H&P - LABOR: BISHOP SCORE
-FEV1 43%  -Tolerating Breztri, albuterol as needed  -Daliresp 500  -Oxygen dependent, start low-dose prednisone chronic  -Is interested in palliative care, we have already referred
Braxton Holloway  continues to use and benefit from supplemental oxygen
10

## 2022-10-04 NOTE — OB PROVIDER LABOR PROGRESS NOTE - NS_SUBJECTIVE/OBJECTIVE_OBGYN_ALL_OB_FT
Patient seen and examined   comfortable with epi and wang in place    SVE: 10/100/0 AROM Moderate Mec  CNX: q3  Cat1 tracing    Continue monitoring. pateint allowed to labor down

## 2022-10-04 NOTE — OB RN DELIVERY SUMMARY - NSSELHIDDEN_OBGYN_ALL_OB_FT
[NS_DeliveryAttending1_OBGYN_ALL_OB_FT:[id number]],[NS_DeliveryAssist1_OBGYN_ALL_OB_FT:[id number]=],[NS_DeliveryRN_OBGYN_ALL_OB_FT:[id number]=]

## 2022-10-04 NOTE — OB PROVIDER H&P - NS_FHRLOC_OBGYN_ALL_OB
Physical Therapy Daily Treatment Note/Plan of Care Update   Name: Adryan Garcia  Clinic Number: [id number]  Age at Evaluation: 5  y.o. 3  m.o.     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 Mother informed of primary PT (Anne Lino)'s absence over the next couple of weeks. Expresses interest in decreaseing therapy frequency to every other week to align with weeks that she is not receiving therapy services in school.   Response to previous treatment: improved transition into session  Pain: Adryan is unable to rate pain on numeric scale. Pain: Patient scored 0/10 on the FLACC scale for assessment of non-verbal signs of Pain using the following criteria:    Criteria Score: 0 Score: 1 Score: 2   Face No particular expression or smile Occasional grimace or frown, withdrawn, uninterested Frequent to constant quivering chin, clenched jaw   Legs Normal position or relaxed Uneasy, restless, tense Kicking, or legs drawn up   Activity Lying quietly, normal position moves easily Squirming, shifting, back and forth, tense Arched, rigid, or jerking   Cry No cry (awake or asleep) Moans or whimpers; occasional complaint Crying steadily, screams or sobs, frequent complaints   Consolability  Content, relaxed Reassured by occasional touching, hugging or being talked to, disractible Difficult to console or comfort     [Nirav DIAZ, Inderjit Pelayo T, Carlos S. Pain assessment in infants and young children: the FLACC scale. Am J Nurse. 2002;[id number])55-8.]     Objective   Session focused on:  ankle passive range of motion left lower extremity, proximal and LE strength, muscular endurance, standing balance, coordination, posture, facilitation of gait, promotion of adaptive responses to environmental demands, cardiovascular endurance training, parent education and training, progression of HEP, core muscle activation.     Adryan received therapeutic exercises to develop strength, endurance, ROM, posture and core stabilization for 45 minutes including:   - bilateral AFO and shoes donned with increased need for distraction and motivation  -  gait trials ranging from 10-25 feet. X 8 attempts; attempting to use assistive device, patient resistant with trials. [place] 2 hand held assist from PT   - static stance at vertical surface x 5 minutes x 3 attempts, with PT providing tactile cueing at pelvis to promote equal weight bearing.  - endurance training via manual propulsion of wheel chair 10 feet x 5 attempts  with maximal verbal cueing throughout to continue to push   - facilitation of climbing onto/off of therapy mat x multiple attempts for proximal strengthening     Home Exercises Provided and Patient Education Provided    Written Home Exercises Provided: no     See EMR: no     Assessment   Adryan was seen for a follow up visit with improved tolerance for treatment session. Mother with discussion with PT regarding decreased frequency of outpatient therapy services to every other week, secondary to increased therapy services in school system. Patient to be seen by outpatient services on weeks opposite of therapy services in school system. Treatment session today focused on static stance at vertical surface, with  intermittent gait trials with 2 hand held assist. Patient continues with resistance with assistive devices in session. Physical Therapy to continue to trial ambulation with various assistive devices to improve patient's independent mobility and to continue to progress toward goals listed below. Mother to be thoroughly educated at future sessions regarding assisting patient with ambulation trials at home. Plan of care adjusted to represent recent changes in frequency agreed upon by mother and Physical Therapist.   Improvements noted in: improved fit of orthotics with custom adjustments provided by orthotist.   Limited/no progress noted: progressing towards goals.   Adryan is progressing well towards her goals.   Pt prognosis is Guarded.      Pt will continue to benefit from skilled outpatient physical therapy to address the deficits listed in the problem list box on initial evaluation, provide pt/family education and to maximize pt's level of independence in the home and community environment.      Pt's spiritual, cultural and educational needs considered and pt agreeable to plan of care and goals.     Anticipated barriers to physical therapy: none     Goals:  Goal: Patient/Caregivers will verbalize understanding of HEP and report ongoing adherence.   Date Initiated: 8/26/20  Duration: Ongoing through discharge   Status: Progressing, not met 8/31/2022  Comments: mother to obtain shoes for orthotics prior to next visit       Goal: Improve cardiovascular endurance evident by ability to maneuver x 50' with gait   Date Initiated: 7/29/21  Duration: 2 months  Status: Progressing, not met, 8/31/2022  Comments:              Plan   Plan of care Certification: 7/29/21-12/1/2022     Outpatient Physical Therapy 1-4 times monthly for an additional 3 months to include the following interventions: Gait Training, Manual Therapy, Neuromuscular Re-ed, Patient Education and Therapeutic Exercise.     Anne Lino, PT, DPT  [facility]

## 2022-10-04 NOTE — DISCHARGE NOTE OB - CARE PROVIDER_API CALL
Philip Davis)  MaternalFetal Medicine; Obstetrics and Gynecology  [address] [place] [id number]  Phone: [phone]  Fax: [phone]  Established Patient  Follow Up Time:

## 2022-10-05 LAB
COVID-19 SPIKE DOMAIN AB INTERP: POSITIVE
COVID-19 SPIKE DOMAIN ANTIBODY RESULT: >250 U/ML — HIGH
HBV SURFACE AG SERPL QL IA: SIGNIFICANT CHANGE UP
RUBV IGG SER-ACNC: 1.1 INDEX — SIGNIFICANT CHANGE UP
RUBV IGG SER-IMP: POSITIVE — SIGNIFICANT CHANGE UP
SARS-COV-2 IGG+IGM SERPL QL IA: >250 U/ML — HIGH
SARS-COV-2 IGG+IGM SERPL QL IA: POSITIVE
SARS-COV-2 RNA SPEC QL NAA+PROBE: SIGNIFICANT CHANGE UP
T PALLIDUM AB TITR SER: NEGATIVE — SIGNIFICANT CHANGE UP

## 2022-10-05 RX ORDER — IBUPROFEN 200 MG
600 TABLET ORAL EVERY 6 HOURS
Refills: 0 | Status: DISCONTINUED | OUTPATIENT
Start: 2022-10-05 | End: 2022-10-06

## 2022-10-05 RX ADMIN — Medication 30 MILLIGRAM(S): at 03:15

## 2022-10-05 RX ADMIN — Medication 600 MILLIGRAM(S): at 10:02

## 2022-10-05 RX ADMIN — Medication 975 MILLIGRAM(S): at 11:08

## 2022-10-05 RX ADMIN — Medication 600 MILLIGRAM(S): at 18:22

## 2022-10-05 RX ADMIN — Medication 600 MILLIGRAM(S): at 09:02

## 2022-10-05 RX ADMIN — SODIUM CHLORIDE 3 MILLILITER(S): 9 INJECTION INTRAMUSCULAR; INTRAVENOUS; SUBCUTANEOUS at 17:18

## 2022-10-05 RX ADMIN — Medication 30 MILLIGRAM(S): at 02:38

## 2022-10-05 RX ADMIN — Medication 600 MILLIGRAM(S): at 17:33

## 2022-10-05 RX ADMIN — Medication 975 MILLIGRAM(S): at 03:15

## 2022-10-05 RX ADMIN — Medication 975 MILLIGRAM(S): at 12:07

## 2022-10-05 RX ADMIN — Medication 975 MILLIGRAM(S): at 22:00

## 2022-10-05 RX ADMIN — Medication 975 MILLIGRAM(S): at 02:38

## 2022-10-05 RX ADMIN — Medication 975 MILLIGRAM(S): at 20:59

## 2022-10-05 NOTE — PROVIDER CONTACT NOTE (OTHER) - SITUATION
patient expressed clot x1 this am while in the bathroom. patient states she passed a clot last shift of similar size.

## 2022-10-05 NOTE — PROGRESS NOTE ADULT - SUBJECTIVE AND OBJECTIVE BOX
S: Patient doing well. No complaints. Minimal lochia. Pain controlled.    O: Vital Signs Last 24 Hrs  T(C): 36.6 (05 Oct 2022 10:01), Max: 36.9 (04 Oct 2022 20:14)  T(F): 97.9 (05 Oct 2022 10:01), Max: 98.42 (04 Oct 2022 22:21)  HR: 80 (05 Oct 2022 10:) (70 - 130)  BP: 110/55 (05 Oct 2022 10:) (99/63 - 135/72)  BP(mean): --  RR: 18 (05 Oct 2022 10:01) (16 - 19)  SpO2: 99% (05 Oct 2022 10:) (91% - 100%)    Parameters below as of 05 Oct 2022 01:30  Patient On (Oxygen Delivery Method): room air        Gen: NAD  Abd: soft, Nontender, Nondistended, fundus firm  Ext: no tendern, mild edema    Labs:                        12.8   10.58 )-----------( 264      ( 04 Oct 2022 22:00 )             40.5       A: 32y PPD#1 s/p  doing well.    Plan:  Routine postpartum care  Encouraged out of bed  Regular diet

## 2022-10-05 NOTE — LACTATION INITIAL EVALUATION - LACTATION INTERVENTIONS
Primary RN made aware of consult , observations and plan./initiate/review safe skin-to-skin/initiate/review hand expression/initiate/review techniques for position and latch/post discharge community resources provided/review techniques to manage sore nipples/engorgement/initiate/review breast massage/compression/reviewed components of an effective feeding and at least 8 effective feedings per day required/reviewed importance of monitoring infant diapers, the breastfeeding log, and minimum output each day/reviewed benefits and recommendations for rooming in/reviewed feeding on demand/by cue at least 8 times a day

## 2022-10-05 NOTE — CHART NOTE - NSCHARTNOTEFT_GEN_A_CORE
Notified by RN that patient passed blood clot during ambulation to bathroom. Patient is  PPD#1 s/p precipitous  with QBL 164ml. Delivery uncomplicated with no lacerations. Patient states she also passed a blood clot of similar size around 3am upon ambulating. Patient states she has not been ambulating or urinating often due to cramping/pain, but reports bleeding being otherwise normal. VSS. Denies dizziness, lightheadedness, CP, SOB, N/V, fevers, chills.    Vital Signs Last 24 Hrs  T(C): 36.6 (05 Oct 2022 10:01), Max: 36.9 (04 Oct 2022 20:14)  T(F): 97.9 (05 Oct 2022 10:01), Max: 98.42 (04 Oct 2022 22:21)  HR: 80 (05 Oct 2022 10:01) (70 - 130)  BP: 110/55 (05 Oct 2022 10:01) (99/63 - 135/72)  RR: 18 (05 Oct 2022 10:01) (16 - 19)  SpO2: 99% (05 Oct 2022 10:01) (91% - 100%)    Parameters below as of 05 Oct 2022 01:30  Patient On (Oxygen Delivery Method): room air                          12.8   10.58 )-----------( 264      ( 04 Oct 2022 22:00 )             40.5     Physical Exam  General: NAD  Abdomen: soft, non-tender. Fundus firm at umbilicus  Vaginal: Minimal lochia noted on peripad; Minimal amount of vaginal trickling noted on fundal palpation  Extremities: No erythema    A/P:   PPD#1 s/p precipitous  with episode of passing blood clot.    -Blood clot in bathroom assessed and is approx 50cc  -Patient asymptomatic  -Vaginal bleeding WNL. No heavy bleeding/acute blood clots at this time  -VSS  -Patient educated on importance of increasing ambulation and urination to decrease risk of heavy bleeding in postpartum period  -Continue pain management with Motrin, Tylenol, and oxycodone PRN  -Patient instructed to ambulate in 1 hour and attempt urination  -Consider bimanual exam if blood clots persist.    to be d/w Dr. Faheem Rodriguez [facility] Notified by RN that patient passed blood clot during ambulation to bathroom. Patient is  PPD#1 s/p precipitous  with QBL 164ml. Delivery uncomplicated with no lacerations. Patient states she also passed a blood clot of similar size around 3am upon ambulating. Patient states she has not been ambulating or urinating often due to cramping/pain, but reports bleeding being otherwise normal. VSS. Denies dizziness, lightheadedness, CP, SOB, N/V, fevers, chills.    Vital Signs Last 24 Hrs  T(C): 36.6 (05 Oct 2022 10:01), Max: 36.9 (04 Oct 2022 20:14)  T(F): 97.9 (05 Oct 2022 10:01), Max: 98.42 (04 Oct 2022 22:21)  HR: 80 (05 Oct 2022 10:01) (70 - 130)  BP: 110/55 (05 Oct 2022 10:01) (99/63 - 135/72)  RR: 18 (05 Oct 2022 10:01) (16 - 19)  SpO2: 99% (05 Oct 2022 10:01) (91% - 100%)    Parameters below as of 05 Oct 2022 01:30  Patient On (Oxygen Delivery Method): room air                          12.8   10.58 )-----------( 264      ( 04 Oct 2022 22:00 )             40.5     Physical Exam  General: NAD  Abdomen: soft, non-tender. Fundus firm at umbilicus  Vaginal: Minimal lochia noted on peripad; Minimal amount of vaginal trickling noted on fundal palpation  Extremities: No erythema    A/P:   PPD#1 s/p precipitous  with episode of passing blood clot.    -Blood clot in bathroom assessed and is approx 50cc  -Patient asymptomatic  -Vaginal bleeding WNL. No heavy bleeding/acute blood clots at this time  -VSS  -Patient educated on importance of increasing ambulation and urination to decrease risk of heavy bleeding in postpartum period  -Continue pain management with Motrin, Tylenol, and oxycodone PRN  -Patient instructed to ambulate in 1 hour and attempt urination. Instructed RN to notify provider if heavy bleeding/additional clots are noted.  -Consider bimanual exam if blood clots persist.    to be d/w Dr. Faheem Rodriguez [facility]

## 2022-10-06 ENCOUNTER — APPOINTMENT (OUTPATIENT)
Dept: OBGYN | Facility: CLINIC | Age: 32
End: 2022-10-06

## 2022-10-06 ENCOUNTER — APPOINTMENT (OUTPATIENT)
Dept: ANTEPARTUM | Facility: CLINIC | Age: 32
End: 2022-10-06

## 2022-10-06 VITALS
TEMPERATURE: 98 F | SYSTOLIC BLOOD PRESSURE: 118 MMHG | RESPIRATION RATE: 17 BRPM | HEART RATE: 74 BPM | DIASTOLIC BLOOD PRESSURE: 62 MMHG | OXYGEN SATURATION: 100 %

## 2022-10-06 RX ADMIN — Medication 600 MILLIGRAM(S): at 11:17

## 2022-10-06 RX ADMIN — Medication 600 MILLIGRAM(S): at 00:04

## 2022-10-06 RX ADMIN — Medication 600 MILLIGRAM(S): at 06:00

## 2022-10-06 RX ADMIN — Medication 600 MILLIGRAM(S): at 05:21

## 2022-10-06 RX ADMIN — Medication 975 MILLIGRAM(S): at 09:20

## 2022-10-06 RX ADMIN — Medication 975 MILLIGRAM(S): at 08:46

## 2022-10-06 RX ADMIN — Medication 1 TABLET(S): at 11:17

## 2022-10-06 RX ADMIN — Medication 600 MILLIGRAM(S): at 11:49

## 2022-10-06 RX ADMIN — Medication 600 MILLIGRAM(S): at 01:00

## 2022-10-06 RX ADMIN — Medication 975 MILLIGRAM(S): at 14:13

## 2022-10-06 NOTE — PROGRESS NOTE ADULT - SUBJECTIVE AND OBJECTIVE BOX
Pt without complaints. Lochia decreased  Vital Signs Last 24 Hrs  T(C): 36.9 (06 Oct 2022 05:30), Max: 36.9 (05 Oct 2022 18:03)  T(F): 98.5 (06 Oct 2022 05:30), Max: 98.5 (06 Oct 2022 05:30)  HR: 75 (06 Oct 2022 05:30) (75 - 82)  BP: 108/66 (06 Oct 2022 05:30) (108/66 - 121/59)  BP(mean): --  RR: 16 (06 Oct 2022 05:30) (16 - 17)  SpO2: 99% (06 Oct 2022 05:30) (99% - 99%)    Parameters below as of 06 Oct 2022 05:30  Patient On (Oxygen Delivery Method): room air    Blood Type: O Positive                        12.8   10.58 )-----------( 264      ( 04 Oct 2022 22:00 )             40.5     abdomen soft, non-tender, fundus firm  extremities non tender  s/p nsd  doing well  d/c home

## 2022-11-07 PROBLEM — A74.9 CHLAMYDIAL INFECTION, UNSPECIFIED: Chronic | Status: ACTIVE | Noted: 2022-10-04

## 2022-11-09 ENCOUNTER — NON-APPOINTMENT (OUTPATIENT)
Age: 32
End: 2022-11-09

## 2022-11-15 ENCOUNTER — APPOINTMENT (OUTPATIENT)
Dept: OBGYN | Facility: CLINIC | Age: 32
End: 2022-11-15

## 2022-11-15 VITALS
DIASTOLIC BLOOD PRESSURE: 53 MMHG | HEIGHT: 62 IN | SYSTOLIC BLOOD PRESSURE: 111 MMHG | WEIGHT: 211 LBS | BODY MASS INDEX: 38.83 KG/M2

## 2022-11-15 PROCEDURE — 0503F POSTPARTUM CARE VISIT: CPT

## 2022-11-15 NOTE — HISTORY OF PRESENT ILLNESS
[Complications:___] : no complications [Delivery Date: ___] : on [unfilled] [] : delivered by vaginal delivery [Female] : Delivery History: baby girl [Wt. ___] : weighing [unfilled] [Breastfeeding] : currently nursing [BF with Difficulty] : nursing without difficulty [Back to Normal] : is back to normal in size [Normal] : the vagina was normal [Healing Well] : is healing well [Examination Of The Breasts] : breasts are normal [Doing Well] : is doing well [No Sign of Infection] : is showing no signs of infection [Excellent Pain Control] : has excellent pain control [None] : None [FreeTextEntry8] : 31 y/o F s/p  on 10/04/2022 presenting for postpartum visit. Liveborn FEMALE. She is breastfeeding. She is feeling well and is without complaints. Her mood is stable. [de-identified] : PP exam WNL  [de-identified] : 31 y/o F presenting for 6 week PP visit\par -normal PP exam\par -Patient cleared to resume intercourse and exercise\par -Patient counseled on contraception options, desires depo\par -f/u for depo injection

## 2022-11-26 NOTE — OB PROVIDER H&P - NSINFANTSEX1_OBGYN_ALL_OB
chlorhexidine/Adherence to aseptic technique: hand hygiene prior to donning barriers (gown, gloves), don cap and mask, sterile drape over patient
chlorhexidine/Adherence to aseptic technique: hand hygiene prior to donning barriers (gown, gloves), don cap and mask, sterile drape over patient
Male

## 2023-02-13 ENCOUNTER — APPOINTMENT (OUTPATIENT)
Dept: OBGYN | Facility: CLINIC | Age: 33
End: 2023-02-13
Payer: MEDICAID

## 2023-02-13 VITALS
HEIGHT: 62 IN | WEIGHT: 222 LBS | BODY MASS INDEX: 40.85 KG/M2 | SYSTOLIC BLOOD PRESSURE: 109 MMHG | HEART RATE: 76 BPM | DIASTOLIC BLOOD PRESSURE: 69 MMHG

## 2023-02-13 DIAGNOSIS — Z80.3 FAMILY HISTORY OF MALIGNANT NEOPLASM OF BREAST: ICD-10-CM

## 2023-02-13 DIAGNOSIS — Z01.419 ENCOUNTER FOR GYNECOLOGICAL EXAMINATION (GENERAL) (ROUTINE) W/OUT ABNORMAL FINDINGS: ICD-10-CM

## 2023-02-13 DIAGNOSIS — Z3A.37 37 WEEKS GESTATION OF PREGNANCY: ICD-10-CM

## 2023-02-13 DIAGNOSIS — Z33.1 PREGNANT STATE, INCIDENTAL: ICD-10-CM

## 2023-02-13 DIAGNOSIS — Z3A.16 16 WEEKS GESTATION OF PREGNANCY: ICD-10-CM

## 2023-02-13 DIAGNOSIS — Z3A.24 24 WEEKS GESTATION OF PREGNANCY: ICD-10-CM

## 2023-02-13 DIAGNOSIS — R10.2 PELVIC AND PERINEAL PAIN: ICD-10-CM

## 2023-02-13 DIAGNOSIS — O09.299 SUPERVISION OF PREGNANCY WITH OTHER POOR REPRODUCTIVE OR OBSTETRIC HISTORY, UNSPECIFIED TRIMESTER: ICD-10-CM

## 2023-02-13 PROCEDURE — 99395 PREV VISIT EST AGE 18-39: CPT

## 2023-02-13 RX ORDER — ACETAMINOPHEN 325 MG/1
325 TABLET ORAL
Qty: 40 | Refills: 0 | Status: DISCONTINUED | COMMUNITY
Start: 2023-01-31

## 2023-02-13 RX ORDER — MEDROXYPROGESTERONE ACETATE 150 MG/ML
150 INJECTION, SUSPENSION INTRAMUSCULAR
Qty: 1 | Refills: 3 | Status: DISCONTINUED | COMMUNITY
Start: 2022-11-15 | End: 2023-02-13

## 2023-02-13 RX ORDER — VALACYCLOVIR 500 MG/1
500 TABLET, FILM COATED ORAL
Qty: 30 | Refills: 0 | Status: DISCONTINUED | COMMUNITY
Start: 2022-03-30 | End: 2023-02-13

## 2023-02-13 NOTE — HISTORY OF PRESENT ILLNESS
[Y] : Positive pregnancy history [Regular Cycle Intervals] : periods have been regular [Currently Active] : currently active [Men] : men [Vaginal] : vaginal [No] : No [Patient refuses STI testing] : Patient refuses STI testing [LMPDate] : 2/6/23 [PGHxTotal] : 2 [BannerxFullTerm] : 2 [PGHxPremature] : 0 [PGHxAbortions] : 0 [[facility]xLiving] : 2 [PGHxABInduced] : 0 [PGHxABSpont] : 0 [PGHxEctopic] : 0 [PGHxMultBirths] : 0 [FreeTextEntry1] : 2/6/23 [FreeTextEntry3] : pull out method

## 2023-02-13 NOTE — PLAN
[FreeTextEntry1] : 32 y/o female presenting for annual exam:\par -f/u pap and GC/CT\par -Contraception: pull out method\par -f/u PRN\par

## 2023-02-13 NOTE — PHYSICAL EXAM
[Chaperone Present] : A chaperone was present in the examining room during all aspects of the physical examination [Appropriately responsive] : appropriately responsive [FreeTextEntry1] : Aubree [Alert] : alert [No Acute Distress] : no acute distress [Soft] : soft [Non-tender] : non-tender [Non-distended] : non-distended [No HSM] : No HSM [No Lesions] : no lesions [No Mass] : no mass [Oriented x3] : oriented x3 [Examination Of The Breasts] : a normal appearance [No Masses] : no breast masses were palpable [Labia Majora] : normal [Labia Minora] : normal [Normal] : normal

## 2023-02-14 LAB
C TRACH RRNA SPEC QL NAA+PROBE: NOT DETECTED
HPV 16 E6+E7 MRNA CVX QL NAA+PROBE: NOT DETECTED
HPV HIGH+LOW RISK DNA PNL CVX: NOT DETECTED
HPV HIGH+LOW RISK DNA PNL CVX: NOT DETECTED
HPV18+45 E6+E7 MRNA CVX QL NAA+PROBE: NOT DETECTED
N GONORRHOEA RRNA SPEC QL NAA+PROBE: NOT DETECTED
SOURCE AMPLIFICATION: NORMAL

## 2023-02-16 LAB — CYTOLOGY CVX/VAG DOC THIN PREP: ABNORMAL

## 2024-06-27 NOTE — ED ADULT NURSE NOTE - CCCP TRG CHIEF CMPLNT
Use preservative free artificial tears up to every hour as needed for irritation or discomfort.      Discontinue Ofloxacin   labor check

## 2024-12-13 ENCOUNTER — NON-APPOINTMENT (OUTPATIENT)
Age: 34
End: 2024-12-13

## 2024-12-23 NOTE — OB RN PATIENT PROFILE - FUNCTIONAL ASSESSMENT - BASIC MOBILITY 5.
Expected Date Of Service: 09/30/2024 Performing Laboratory: -[id number] Billing Type: Third-Party Bill Bill For Surgical Tray: no 4 = No assist / stand by assistance

## 2025-01-13 NOTE — DISCHARGE NOTE OB - PROVIDER TOKENS
CERTIFICATE OF RETURN TO SCHOOL    January 13, 2025      Re:   Mirta Monzon  [id number] [facility] [address]                        This is to certify that Mirta Monzon was seen on 1/13/2025 and is excused from school on 1/13/2025 and 1/14/2025.    RESTRICTIONS: none.    Medical information is private and protected by HIPAA Law.         SIGNATURE:___________________________________________,   1/13/2025      DORETHA Mccord APNP  [facility]  [id number] [facility] [address]  Dept Phone: [phone]  
PROVIDER:[TOKEN:[[id number]],ESTABLISHEDPATIENT:[T]]

## 2025-07-19 NOTE — DISCHARGE NOTE OB - CONTRAINDICATIONS & PRECAUTIONS (SELECT ALL THAT APPLY)
What Type Of Note Output Would You Prefer (Optional)?: Standard Output How Severe Is Your Rash?: moderate Is This A New Presentation, Or A Follow-Up?: Rash Additional History: Patient states he has some redness around his nose, has been seen for it previously and was prescribed metronidazole cream. Patient would like to be examined and obtain a refill of said medication. Patient/surrogate refused vaccine...